# Patient Record
(demographics unavailable — no encounter records)

---

## 2025-02-18 NOTE — HISTORY OF PRESENT ILLNESS
[FreeTextEntry1] : 58 yo female with h/o DM, Htn,CAD s/p stent one year ago, h/o GERD, admitted with syncope, recently admitted to [facility] for GERD. Patient on pantoprazole  and carafate and feels better  but  reports excessive gas , ACS ruled out echo reports EF 63 precent. Patient also with c/o rectal pain and constipation  s/p colonsocopy per patient 6 years ago, negative per patient due now for colonoscoy s/p egd last month at [facility], where she was admitted told normal, no report available.

## 2025-02-18 NOTE — REASON FOR VISIT
[Initial Evaluation] : an initial evaluation [Source: ______] : History obtained from [unfilled] [Family Member] : family member [FreeTextEntry1] : gerd

## 2025-02-18 NOTE — ASSESSMENT
[FreeTextEntry1] : 1.h/o GERD on asa s/p egd last month no results available  plan continue ppi and sucralfate   2. constipation/gas /bloating  plan recommend gasx tid low FODMAP diet add senna to miralax daily f/u gi after hypnatremia resolved with her daughter to discuss colonoscopy

## 2025-02-18 NOTE — PHYSICAL EXAM
[Alert] : alert [Healthy Appearing] : healthy appearing [Sclera] : the sclera and conjunctiva were normal [Hearing Threshold Finger Rub Not [place]] : hearing was normal [Normal Appearance] : the appearance of the neck was normal [No Respiratory Distress] : no respiratory distress [Auscultation Breath Sounds / Voice Sounds] : lungs were clear to auscultation bilaterally [Heart Rate And Rhythm] : heart rate was normal and rhythm regular [Bowel Sounds] : normal bowel sounds [Abdomen Tenderness] : non-tender [Abdomen Soft] : soft [No CVA Tenderness] : no CVA  tenderness [Abnormal Walk] : normal gait [] : no rash [No Focal Deficits] : no focal deficits [Oriented To Time, Place, And Person] : oriented to person, place, and time

## 2025-02-18 NOTE — REVIEW OF SYSTEMS
[As Noted in HPI] : as noted in HPI [Feeling Poorly] : not feeling poorly [Feeling Tired] : not feeling tired [Red Eyes] : eyes not red [Sore Throat] : no sore throat [Chest Pain] : no chest pain [Palpitations] : no palpitations [SOB on Exertion] : no shortness of breath during exertion [Rash] : no rash [Joint Stiffness] : no joint stiffness [Skin Wound] : no skin wound [Dizziness] : no dizziness [Fainting] : no fainting [Anxiety] : no anxiety [Muscle Weakness] : no muscle weakness [Easy Bruising] : no tendency for easy bruising